# Patient Record
Sex: FEMALE | Race: BLACK OR AFRICAN AMERICAN | NOT HISPANIC OR LATINO | Employment: OTHER | URBAN - METROPOLITAN AREA
[De-identification: names, ages, dates, MRNs, and addresses within clinical notes are randomized per-mention and may not be internally consistent; named-entity substitution may affect disease eponyms.]

---

## 2018-04-26 ENCOUNTER — OFFICE VISIT (OUTPATIENT)
Dept: FAMILY MEDICINE | Facility: CLINIC | Age: 63
End: 2018-04-26
Payer: COMMERCIAL

## 2018-04-26 VITALS
WEIGHT: 191.38 LBS | SYSTOLIC BLOOD PRESSURE: 124 MMHG | OXYGEN SATURATION: 98 % | BODY MASS INDEX: 31.89 KG/M2 | TEMPERATURE: 98 F | HEIGHT: 65 IN | HEART RATE: 91 BPM | RESPIRATION RATE: 16 BRPM | DIASTOLIC BLOOD PRESSURE: 84 MMHG

## 2018-04-26 DIAGNOSIS — S80.211A ABRASION OF RIGHT KNEE, INITIAL ENCOUNTER: ICD-10-CM

## 2018-04-26 DIAGNOSIS — J00 ACUTE NASOPHARYNGITIS (COMMON COLD): Primary | ICD-10-CM

## 2018-04-26 PROCEDURE — 99204 OFFICE O/P NEW MOD 45 MIN: CPT | Mod: S$GLB,,, | Performed by: INTERNAL MEDICINE

## 2018-04-26 PROCEDURE — 99999 PR PBB SHADOW E&M-NEW PATIENT-LVL III: CPT | Mod: PBBFAC,,, | Performed by: INTERNAL MEDICINE

## 2018-04-26 RX ORDER — PROMETHAZINE HYDROCHLORIDE AND DEXTROMETHORPHAN HYDROBROMIDE 6.25; 15 MG/5ML; MG/5ML
5 SYRUP ORAL EVERY 12 HOURS PRN
Qty: 180 ML | Refills: 0 | Status: SHIPPED | OUTPATIENT
Start: 2018-04-26 | End: 2018-05-06

## 2018-04-26 RX ORDER — LEVOTHYROXINE SODIUM 25 UG/1
TABLET ORAL
Refills: 1 | COMMUNITY
Start: 2018-02-19

## 2018-04-26 NOTE — PROGRESS NOTES
SUBJECTIVE     Chief Complaint   Patient presents with    Cough       HPI  Rosana Alonso is a 62 y.o. female with multiple medical diagnoses as listed in the medical history and problem list that presents for evaluation of cough x 2 days. Pt reports a productive cough, hoarseness, and sore throat, and R ear pain. Denies any runny nose, SOB, chest tightness, fever, chills, or night sweats. Pt has been taking OTC cough meds and Nyquil with some improvement of symptoms. +sick contacts(grandson with URI). Denies any recent travel.     PAST MEDICAL HISTORY:  Past Medical History:   Diagnosis Date    Hypothyroid        PAST SURGICAL HISTORY:  Past Surgical History:   Procedure Laterality Date     SECTION      ,        SOCIAL HISTORY:  Social History     Social History    Marital status: Single     Spouse name: N/A    Number of children: N/A    Years of education: N/A     Occupational History    Not on file.     Social History Main Topics    Smoking status: Never Smoker    Smokeless tobacco: Never Used    Alcohol use No    Drug use: No    Sexual activity: Not on file     Other Topics Concern    Not on file     Social History Narrative    No narrative on file       FAMILY HISTORY:  Family History   Problem Relation Age of Onset    Diabetes type II Mother     Cardiomyopathy Mother     Hypertension Mother     Sudden death Father      Dad killed in MVA       ALLERGIES AND MEDICATIONS: updated and reviewed.  Review of patient's allergies indicates:  No Known Allergies  Current Outpatient Prescriptions   Medication Sig Dispense Refill    levothyroxine (SYNTHROID) 25 MCG tablet TK 1 T PO QD UTD  1    promethazine-dextromethorphan (PROMETHAZINE-DM) 6.25-15 mg/5 mL Syrp Take 5 mLs by mouth every 12 (twelve) hours as needed. 180 mL 0     No current facility-administered medications for this visit.        ROS  Review of Systems   Constitutional: Negative for chills and fever.   HENT:  "Positive for congestion and sore throat. Negative for hearing loss.    Eyes: Negative for visual disturbance.   Respiratory: Positive for cough. Negative for shortness of breath.    Cardiovascular: Negative for chest pain, palpitations and leg swelling.   Gastrointestinal: Negative for abdominal pain, constipation, diarrhea, nausea and vomiting.   Genitourinary: Negative for dysuria, frequency and urgency.   Musculoskeletal: Negative for arthralgias, joint swelling and myalgias.   Skin: Positive for wound (R knee abrasion). Negative for rash.   Neurological: Negative for headaches.   Psychiatric/Behavioral: Negative for agitation and confusion. The patient is not nervous/anxious.          OBJECTIVE     Physical Exam  Vitals:    04/26/18 1603   BP: 124/84   Pulse: 91   Resp: 16   Temp: 98.3 °F (36.8 °C)    Body mass index is 31.84 kg/m².  Weight: 86.8 kg (191 lb 5.8 oz)   Height: 5' 5" (165.1 cm)     Physical Exam   Constitutional: She is oriented to person, place, and time. She appears well-developed and well-nourished. No distress.   HENT:   Head: Normocephalic and atraumatic.   Right Ear: Hearing, tympanic membrane and external ear normal.   Left Ear: Hearing, tympanic membrane and external ear normal.   Nose: Nose normal. No rhinorrhea. Right sinus exhibits no maxillary sinus tenderness and no frontal sinus tenderness. Left sinus exhibits no maxillary sinus tenderness and no frontal sinus tenderness.   Mouth/Throat: No uvula swelling. Posterior oropharyngeal erythema present. No posterior oropharyngeal edema.   Eyes: Conjunctivae and EOM are normal. Right eye exhibits no discharge. Left eye exhibits no discharge. No scleral icterus.   Neck: Normal range of motion. Neck supple. No JVD present. No tracheal deviation present.   Cardiovascular: Normal rate, regular rhythm and intact distal pulses.  Exam reveals no gallop and no friction rub.    No murmur heard.  Pulmonary/Chest: Effort normal and breath sounds " normal. No respiratory distress. She has no wheezes.   No dullness to percussion, normal fremitus, no egophany   Abdominal: Soft. Bowel sounds are normal. She exhibits no distension and no mass. There is no tenderness. There is no rebound and no guarding.   Musculoskeletal: Normal range of motion. She exhibits no edema, tenderness or deformity.   Neurological: She is alert and oriented to person, place, and time. She exhibits normal muscle tone. Coordination normal.   Skin: Skin is warm and dry. No rash noted. No erythema.   Healing R lateral knee abrasion developing eschar; no surrounding erythema or drainage appreciated   Psychiatric: She has a normal mood and affect. Her behavior is normal. Judgment and thought content normal.         Health Maintenance    Patient has no pending health maintenance at this time           ASSESSMENT     62 y.o. female with     1. Acute nasopharyngitis (common cold)    2. Abrasion of right knee, initial encounter        PLAN:     1. Acute nasopharyngitis (common cold)  - Continue symptomatic treatment with rest, increase fluid intake, tylenol or ibuprofen PRN fever(temp >/= 100.4) or body aches. Okay to take OTC antihistamines, i.e. Bendaryl, Claritin, Allegra, etc. as needed.  - Okay to gargle with warm, salt water or use throat lozenges as needed  - promethazine-dextromethorphan (PROMETHAZINE-DM) 6.25-15 mg/5 mL Syrp; Take 5 mLs by mouth every 12 (twelve) hours as needed.  Dispense: 180 mL; Refill: 0    2. Abrasion of right knee, initial encounter  - Pt advised to cleanse wound, pat dry, and apply triple Abx ointment or Neosporin followed by a clean, dry dressing        RTC in 1-2 weeks as needed for any acute worsening of current condition or failure to improve     Merle Chacon MD  04/26/2018 4:18 PM        No Follow-up on file.

## 2018-04-27 DIAGNOSIS — Z12.39 BREAST CANCER SCREENING: ICD-10-CM

## 2018-08-20 DIAGNOSIS — Z12.11 COLON CANCER SCREENING: ICD-10-CM

## 2018-10-18 ENCOUNTER — OFFICE VISIT (OUTPATIENT)
Dept: FAMILY MEDICINE | Facility: CLINIC | Age: 63
End: 2018-10-18
Payer: COMMERCIAL

## 2018-10-18 VITALS
HEART RATE: 82 BPM | TEMPERATURE: 98 F | SYSTOLIC BLOOD PRESSURE: 128 MMHG | DIASTOLIC BLOOD PRESSURE: 84 MMHG | HEIGHT: 65 IN | WEIGHT: 190.69 LBS | OXYGEN SATURATION: 97 % | BODY MASS INDEX: 31.77 KG/M2

## 2018-10-18 DIAGNOSIS — M65.331 TRIGGER MIDDLE FINGER OF RIGHT HAND: Primary | ICD-10-CM

## 2018-10-18 DIAGNOSIS — Z00.00 HEALTH CARE MAINTENANCE: ICD-10-CM

## 2018-10-18 DIAGNOSIS — Z23 NEEDS FLU SHOT: ICD-10-CM

## 2018-10-18 DIAGNOSIS — M79.645 PAIN OF LEFT MIDDLE FINGER: ICD-10-CM

## 2018-10-18 DIAGNOSIS — Z13.220 LIPID SCREENING: ICD-10-CM

## 2018-10-18 PROCEDURE — 90471 IMMUNIZATION ADMIN: CPT | Mod: S$GLB,,, | Performed by: INTERNAL MEDICINE

## 2018-10-18 PROCEDURE — 99999 PR PBB SHADOW E&M-EST. PATIENT-LVL III: CPT | Mod: PBBFAC,,, | Performed by: INTERNAL MEDICINE

## 2018-10-18 PROCEDURE — 90686 IIV4 VACC NO PRSV 0.5 ML IM: CPT | Mod: S$GLB,,, | Performed by: INTERNAL MEDICINE

## 2018-10-18 PROCEDURE — 99213 OFFICE O/P EST LOW 20 MIN: CPT | Mod: 25,S$GLB,, | Performed by: INTERNAL MEDICINE

## 2018-10-18 PROCEDURE — 3008F BODY MASS INDEX DOCD: CPT | Mod: CPTII,S$GLB,, | Performed by: INTERNAL MEDICINE

## 2018-10-18 NOTE — PROGRESS NOTES
SUBJECTIVE     Chief Complaint   Patient presents with    Bilateral Hand Pain/ Fingers       HPI  Rosana Alonso is a 63 y.o. female with multiple medical diagnoses as listed in the medical history and problem list that presents for evaluation of B/L 3rd digit pain x 6 months. Pt reports she developed R 3rd digit trigger finger and 3 months ago developed L hand 3rd digit pain at the MCP joint. She awakens with morning stiffness to the those fingers only and edematous. She recently started Glucosamine since August, but has not taken it consistently. The R hand has improved, but the L 3rd digit remains painful. Denies any preceding trauma, falls, or heavy lifting. Pt does some 5 lb dumb bell lifting daily.    PAST MEDICAL HISTORY:  Past Medical History:   Diagnosis Date    Hypothyroid        PAST SURGICAL HISTORY:  Past Surgical History:   Procedure Laterality Date     SECTION      1991       SOCIAL HISTORY:  Social History     Socioeconomic History    Marital status:      Spouse name: Not on file    Number of children: Not on file    Years of education: Not on file    Highest education level: Not on file   Social Needs    Financial resource strain: Not on file    Food insecurity - worry: Not on file    Food insecurity - inability: Not on file    Transportation needs - medical: Not on file    Transportation needs - non-medical: Not on file   Occupational History    Not on file   Tobacco Use    Smoking status: Never Smoker    Smokeless tobacco: Never Used   Substance and Sexual Activity    Alcohol use: No    Drug use: No    Sexual activity: Not on file   Other Topics Concern    Not on file   Social History Narrative    Not on file       FAMILY HISTORY:  Family History   Problem Relation Age of Onset    Diabetes type II Mother     Cardiomyopathy Mother     Hypertension Mother     Sudden death Father         Dad killed in MVA       ALLERGIES AND MEDICATIONS: updated  "and reviewed.  Review of patient's allergies indicates:  No Known Allergies  Current Outpatient Medications   Medication Sig Dispense Refill    GLUCOSAMINE HCL ORAL Take by mouth.      levothyroxine (SYNTHROID) 25 MCG tablet TK 1 T PO QD UTD  1     No current facility-administered medications for this visit.        ROS  Review of Systems   Constitutional: Negative for chills and fever.   HENT: Negative for hearing loss and sore throat.    Eyes: Negative for visual disturbance.   Respiratory: Negative for cough and shortness of breath.    Cardiovascular: Negative for chest pain, palpitations and leg swelling.   Gastrointestinal: Negative for abdominal pain, constipation, diarrhea, nausea and vomiting.   Genitourinary: Negative for dysuria, frequency and urgency.   Musculoskeletal: Positive for arthralgias (B/L 3rd digit pain/swelling). Negative for joint swelling and myalgias.   Skin: Negative for rash and wound.   Neurological: Negative for headaches.   Psychiatric/Behavioral: Negative for agitation and confusion. The patient is not nervous/anxious.          OBJECTIVE     Physical Exam  Vitals:    10/18/18 1429   BP: 128/84   Pulse: 82   Temp: 98.4 °F (36.9 °C)    Body mass index is 31.73 kg/m².  Weight: 86.5 kg (190 lb 11.2 oz)   Height: 5' 5" (165.1 cm)     Physical Exam   Constitutional: She is oriented to person, place, and time. She appears well-developed and well-nourished. No distress.   HENT:   Head: Normocephalic and atraumatic.   Right Ear: External ear normal.   Left Ear: External ear normal.   Nose: Nose normal.   Mouth/Throat: Oropharynx is clear and moist.   Eyes: Conjunctivae and EOM are normal. Right eye exhibits no discharge. Left eye exhibits no discharge. No scleral icterus.   Neck: Normal range of motion. Neck supple. No JVD present. No tracheal deviation present.   Pulmonary/Chest: Effort normal. No respiratory distress.   Musculoskeletal: Normal range of motion. She exhibits edema (L 3rd " digit) and tenderness (R 3rd digit MCP TTP). She exhibits no deformity.   Neurological: She is alert and oriented to person, place, and time. She exhibits normal muscle tone. Coordination normal.   Skin: Skin is warm and dry. No rash noted. No erythema.   Psychiatric: She has a normal mood and affect. Her behavior is normal. Judgment and thought content normal.         Health Maintenance       Date Due Completion Date    Hepatitis C Screening 1955 ---    Lipid Panel 1955 ---    TETANUS VACCINE 07/23/1973 ---    Pap Smear with HPV Cotest 07/23/1976 ---    Mammogram 07/23/1995 ---    Colonoscopy 07/23/2005 ---    Zoster Vaccine 07/23/2015 ---    Influenza Vaccine 08/01/2018 ---            ASSESSMENT     63 y.o. female with     1. Trigger middle finger of right hand    2. Pain of left middle finger    3. Lipid screening    4. Health care maintenance    5. Needs flu shot        PLAN:     1. Trigger middle finger of right hand  - Pt advised on rest, ice therapy, and prn NSAIDs  - Okay to continue OTC glucosamine and tumeric as pt has failed previous steroid injection into joint  - Rheumatoid factor; Future  - Cyclic citrul peptide antibody, IgG; Future  - Sedimentation rate; Future  - C-reactive protein; Future    2. Pain of left middle finger  - F/U on labs to r/o RA given AM stiffness and pain  - Rest, ice, and NSAIDs as above  - Rheumatoid factor; Future  - Cyclic citrul peptide antibody, IgG; Future  - Sedimentation rate; Future  - C-reactive protein; Future    3. Lipid screening  - Lipid panel; Future    4. Health care maintenance  - CBC auto differential; Future  - Comprehensive metabolic panel; Future  - Hemoglobin A1c; Future  - TSH; Future    5. Needs flu shot  - Influenza - Quadrivalent (3 years & older) (PF)          RTC in 2 weeks for repeat assessment of current treatment plan       Merle Chacon MD  10/18/2018 2:57 PM        No Follow-up on file.

## 2018-10-18 NOTE — PATIENT INSTRUCTIONS
What is Trigger Finger?  Trigger finger is an inflammation of tissue inside your finger or thumb. It is also called tenosynovitis (ten-oh-sin-oh-VY-tis). Tendons (cordlike fibers that attach muscle to bone and allow you to bend the joints) become swollen. So does the synovium (a slick membrane that allows the tendons to move easily). This makes it difficult to straighten the finger or thumb.    Causes  Repeated use of a tool with strong gripping, such as a drill or wrench, can irritate and inflame the tendons and the synovium. It is also more common in certain medical conditions such as rheumatoid arthritis, gout, and diabetes. But often the cause of trigger finger is unknown.  Inside your finger  Tendons connect muscles in your forearm to the bones in your fingers. The tendons in each finger are surrounded by a protective tendon sheath. This sheath is lined with synovium, which produces a fluid that allows the tendons to slide easily when you bend and straighten the finger. If a tendon is irritated, it becomes inflamed.  When a tendon is inflamed  When a tendon is inflamed, it causes the lining of the tendon sheath to swell and thicken. Or the tendon itself may thicken. Then the sheath pinches the tendon, and the tendon can no longer slide easily inside the sheath. When you straighten your finger, the tendon sticks or locks as it tries to squeeze back through the sheath.    Symptoms  The first sign of trigger finger may be pain where the finger or thumb joins the palm. You may also notice some swelling. As the tendon becomes more inflamed, the finger may start to catch when you try to straighten it. When the locked tendon releases, the finger jumps, as if you were releasing the trigger of a gun. This further irritates the tendon, and may set up a cycle of catching and swelling.   Date Last Reviewed: 9/28/2015  © 8574-3398 [employer]. [address] [place] [address], [place] [address]. All rights reserved.  This information is not intended as a substitute for professional medical care. Always follow your healthcare professional's instructions.        Treating Trigger Finger     The tendon sheath is opened to release the tendon. Once the tendon can move freely again, the finger can bend and straighten more normally.     Trigger finger occurs when the tissue inside your finger or thumb becomes inflamed. Mild cases can be treated without surgery. If the problem is severe, surgery may be needed. Your doctor will discuss your options with you.  Nonsurgical treatment  For mild symptoms, your doctor may have you rest the finger or thumb. You may also be told to take anti-inflammatory medicines. These include ibuprofen or aspirin. You may be given an injection of medicine in the base of the finger or thumb. This typically is a steroid, such as cortisone.  Surgery  If nonsurgical treatments dont ease your symptoms, surgery may be recommended. A tendon is a cordlike fiber that attaches muscle to bone and allows joints to bend. The tendon is surrounded by a protective cover called a sheath. During surgery, the sheath in your finger or thumb is opened to enlarge the space and release the swollen tendon. This allows the finger or thumb to bend and straighten normally. Surgery takes about 20 minutes. It can often be done using a local anesthetic. You may be able to go home the same day. Your hand will be wrapped in a soft bandage. You may need to wear a plaster splint for a short time to keep the finger or thumb still as it heals. The stitches will be removed in about 2 weeks. Your doctor can discuss the risks and benefits of surgery with you.  Date Last Reviewed: 9/21/2015 © 2000-2017 [employer], "[employer]". [address] [place] [address]. All rights reserved. This information is not intended as a substitute for professional medical care. Always follow your healthcare professional's instructions.

## 2018-10-31 ENCOUNTER — LAB VISIT (OUTPATIENT)
Dept: LAB | Facility: HOSPITAL | Age: 63
End: 2018-10-31
Attending: INTERNAL MEDICINE
Payer: COMMERCIAL

## 2018-10-31 DIAGNOSIS — M79.645 PAIN OF LEFT MIDDLE FINGER: ICD-10-CM

## 2018-10-31 DIAGNOSIS — Z00.00 HEALTH CARE MAINTENANCE: ICD-10-CM

## 2018-10-31 DIAGNOSIS — M65.331 TRIGGER MIDDLE FINGER OF RIGHT HAND: ICD-10-CM

## 2018-10-31 DIAGNOSIS — Z13.220 LIPID SCREENING: ICD-10-CM

## 2018-10-31 LAB
ALBUMIN SERPL BCP-MCNC: 4.1 G/DL
ALP SERPL-CCNC: 73 U/L
ALT SERPL W/O P-5'-P-CCNC: 17 U/L
ANION GAP SERPL CALC-SCNC: 7 MMOL/L
AST SERPL-CCNC: 29 U/L
BASOPHILS # BLD AUTO: 0.01 K/UL
BASOPHILS NFR BLD: 0.2 %
BILIRUB SERPL-MCNC: 0.6 MG/DL
BUN SERPL-MCNC: 12 MG/DL
CALCIUM SERPL-MCNC: 10 MG/DL
CHLORIDE SERPL-SCNC: 104 MMOL/L
CHOLEST SERPL-MCNC: 262 MG/DL
CHOLEST/HDLC SERPL: 4.8 {RATIO}
CO2 SERPL-SCNC: 28 MMOL/L
CREAT SERPL-MCNC: 0.9 MG/DL
CRP SERPL-MCNC: 1.7 MG/L
DIFFERENTIAL METHOD: NORMAL
EOSINOPHIL # BLD AUTO: 0.1 K/UL
EOSINOPHIL NFR BLD: 1.1 %
ERYTHROCYTE [DISTWIDTH] IN BLOOD BY AUTOMATED COUNT: 12.1 %
ERYTHROCYTE [SEDIMENTATION RATE] IN BLOOD BY WESTERGREN METHOD: 27 MM/HR
EST. GFR  (AFRICAN AMERICAN): >60 ML/MIN/1.73 M^2
EST. GFR  (NON AFRICAN AMERICAN): >60 ML/MIN/1.73 M^2
GLUCOSE SERPL-MCNC: 89 MG/DL
HCT VFR BLD AUTO: 41.4 %
HDLC SERPL-MCNC: 55 MG/DL
HDLC SERPL: 21 %
HGB BLD-MCNC: 14.2 G/DL
LDLC SERPL CALC-MCNC: 182.2 MG/DL
LYMPHOCYTES # BLD AUTO: 2.1 K/UL
LYMPHOCYTES NFR BLD: 36.1 %
MCH RBC QN AUTO: 31 PG
MCHC RBC AUTO-ENTMCNC: 34.3 G/DL
MCV RBC AUTO: 90 FL
MONOCYTES # BLD AUTO: 0.4 K/UL
MONOCYTES NFR BLD: 7.5 %
NEUTROPHILS # BLD AUTO: 3.1 K/UL
NEUTROPHILS NFR BLD: 55.1 %
NONHDLC SERPL-MCNC: 207 MG/DL
PLATELET # BLD AUTO: 270 K/UL
PMV BLD AUTO: 10.9 FL
POTASSIUM SERPL-SCNC: 5 MMOL/L
PROT SERPL-MCNC: 8.3 G/DL
RBC # BLD AUTO: 4.58 M/UL
SODIUM SERPL-SCNC: 139 MMOL/L
TRIGL SERPL-MCNC: 124 MG/DL
TSH SERPL DL<=0.005 MIU/L-ACNC: 3.05 UIU/ML
WBC # BLD AUTO: 5.7 K/UL

## 2018-10-31 PROCEDURE — 86140 C-REACTIVE PROTEIN: CPT

## 2018-10-31 PROCEDURE — 85652 RBC SED RATE AUTOMATED: CPT

## 2018-10-31 PROCEDURE — 80061 LIPID PANEL: CPT

## 2018-10-31 PROCEDURE — 86200 CCP ANTIBODY: CPT

## 2018-10-31 PROCEDURE — 80053 COMPREHEN METABOLIC PANEL: CPT

## 2018-10-31 PROCEDURE — 83036 HEMOGLOBIN GLYCOSYLATED A1C: CPT

## 2018-10-31 PROCEDURE — 85025 COMPLETE CBC W/AUTO DIFF WBC: CPT

## 2018-10-31 PROCEDURE — 84443 ASSAY THYROID STIM HORMONE: CPT

## 2018-10-31 PROCEDURE — 86431 RHEUMATOID FACTOR QUANT: CPT

## 2018-10-31 PROCEDURE — 36415 COLL VENOUS BLD VENIPUNCTURE: CPT | Mod: PO

## 2018-11-01 DIAGNOSIS — E78.5 HYPERLIPIDEMIA, UNSPECIFIED HYPERLIPIDEMIA TYPE: Primary | ICD-10-CM

## 2018-11-01 LAB
CCP AB SER IA-ACNC: <0.5 U/ML
ESTIMATED AVG GLUCOSE: 103 MG/DL
HBA1C MFR BLD HPLC: 5.2 %
RHEUMATOID FACT SERPL-ACNC: <10 IU/ML

## 2018-11-01 RX ORDER — ATORVASTATIN CALCIUM 40 MG/1
40 TABLET, FILM COATED ORAL DAILY
Qty: 90 TABLET | Refills: 3 | Status: SHIPPED | OUTPATIENT
Start: 2018-11-01 | End: 2018-11-14

## 2018-11-08 ENCOUNTER — TELEPHONE (OUTPATIENT)
Dept: FAMILY MEDICINE | Facility: CLINIC | Age: 63
End: 2018-11-08

## 2018-11-08 NOTE — TELEPHONE ENCOUNTER
Called pt again, but no answer. Left message instructing her to call back at her earliest convenience.

## 2018-11-08 NOTE — TELEPHONE ENCOUNTER
----- Message from Beena Yan sent at 11/8/2018  2:58 PM CST -----  Contact: self  Pt returned call regarding previous message. Please contact pt at [phone].

## 2018-11-08 NOTE — TELEPHONE ENCOUNTER
Called patient. Informed that she has additional questions regarding the results. Wants to talk about alternatives before taking medications for cholesterol and has no idea what a Sed Rate is and what is inflamed. Patient is requesting a callback from provider with concerns.  Please Advise

## 2018-11-08 NOTE — TELEPHONE ENCOUNTER
----- Message from Jennifer Velazquez sent at 11/8/2018  9:11 AM CST -----  Contact: self [phone]  Pt received a letter with letter lab result & would like to get a call back from Dr. Chacon

## 2018-11-09 ENCOUNTER — TELEPHONE (OUTPATIENT)
Dept: FAMILY MEDICINE | Facility: CLINIC | Age: 63
End: 2018-11-09

## 2018-11-09 DIAGNOSIS — E78.5 HYPERLIPIDEMIA, UNSPECIFIED HYPERLIPIDEMIA TYPE: Primary | ICD-10-CM

## 2018-11-09 NOTE — TELEPHONE ENCOUNTER
----- Message from Raudel Saravia sent at 11/9/2018  9:08 AM CST -----  Contact: Self/[phone]  The patient returned the staff call regarding her test results.        Thank you

## 2018-11-14 NOTE — TELEPHONE ENCOUNTER
Called pt and she has decided against taking cholesterol meds. She already exercises, so will now start a low cholesterol diet. Pt advised to RTC in 6 months for repeat lab draw and will then determine if medication is warranted. All questions/concerns addressed and she voiced understanding. Labs ordered.

## 2019-05-21 ENCOUNTER — TELEPHONE (OUTPATIENT)
Dept: ADMINISTRATIVE | Facility: HOSPITAL | Age: 64
End: 2019-05-21

## 2019-07-02 ENCOUNTER — PATIENT OUTREACH (OUTPATIENT)
Dept: ADMINISTRATIVE | Facility: HOSPITAL | Age: 64
End: 2019-07-02

## 2019-08-23 DIAGNOSIS — Z12.11 COLON CANCER SCREENING: ICD-10-CM

## 2019-09-26 ENCOUNTER — HOSPITAL ENCOUNTER (EMERGENCY)
Facility: HOSPITAL | Age: 64
Discharge: HOME | End: 2019-09-26
Attending: EMERGENCY MEDICINE
Payer: COMMERCIAL

## 2019-09-26 VITALS
DIASTOLIC BLOOD PRESSURE: 77 MMHG | RESPIRATION RATE: 20 BRPM | HEIGHT: 65 IN | BODY MASS INDEX: 30.49 KG/M2 | TEMPERATURE: 98 F | SYSTOLIC BLOOD PRESSURE: 141 MMHG | HEART RATE: 70 BPM | WEIGHT: 183 LBS | OXYGEN SATURATION: 99 %

## 2019-09-26 DIAGNOSIS — J30.89 NON-SEASONAL ALLERGIC RHINITIS, UNSPECIFIED TRIGGER: Primary | ICD-10-CM

## 2019-09-26 PROCEDURE — 99282 EMERGENCY DEPT VISIT SF MDM: CPT | Mod: ER

## 2019-09-26 RX ORDER — LORATADINE 10 MG/1
10 TABLET ORAL DAILY
Qty: 30 TABLET | Refills: 0 | Status: SHIPPED | OUTPATIENT
Start: 2019-09-26 | End: 2019-10-26

## 2019-09-26 RX ORDER — FLUTICASONE PROPIONATE 50 MCG
1 SPRAY, SUSPENSION (ML) NASAL 2 TIMES DAILY PRN
Qty: 15 G | Refills: 0 | Status: SHIPPED | OUTPATIENT
Start: 2019-09-26

## 2019-09-26 NOTE — DISCHARGE INSTRUCTIONS
We have prescribed you Claritin and Flonase 4 year symptoms. Please increased fluids and rest for the next 24-48 hours.  Warm fluids and steam may help thin secretions.  Please follow up with her PCP if symptoms not improving in the next 7-10 days.    Our goal in the emergency department is to always give you outstanding care and exceptional service. You may receive a survey by mail or e-mail in the next week regarding your experience in our ED. We would greatly appreciate your completing and returning the survey. Your feedback provides us with a way to recognize our staff who give very good care and it helps us learn how to improve when your experience was below our aspiration of excellence.

## 2019-09-26 NOTE — ED PROVIDER NOTES
Encounter Date: 2019       History     Chief Complaint   Patient presents with    URI     Sinus congestion and mild sore throat since   Patient has been taking sinus max mucinex     Patient is a 64-year-old female with medical history of hypothyroidism presenting to the ED for postnasal drip, scratchy throat congestion, sneezing for the past 4 days.  Patient states she has attempted Mucinex over-the-counter with no relief of symptoms.  Patient denies any cough, fever, chills, chest pain or abdominal pain.    The history is provided by the patient.     Review of patient's allergies indicates:  No Known Allergies  Past Medical History:   Diagnosis Date    Hypothyroid      Past Surgical History:   Procedure Laterality Date     SECTION      ,      Family History   Problem Relation Age of Onset    Diabetes type II Mother     Cardiomyopathy Mother     Hypertension Mother     Sudden death Father         Dad killed in MVA     Social History     Tobacco Use    Smoking status: Never Smoker    Smokeless tobacco: Never Used   Substance Use Topics    Alcohol use: No    Drug use: No     Review of Systems   Constitutional: Negative for activity change, appetite change, chills, fatigue and fever.   HENT: Positive for congestion, postnasal drip, rhinorrhea, sneezing and sore throat. Negative for sinus pressure, sinus pain and trouble swallowing.    Eyes: Negative for photophobia and visual disturbance.   Respiratory: Negative for cough, chest tightness, shortness of breath and wheezing.    Cardiovascular: Negative for chest pain and palpitations.   Gastrointestinal: Negative for abdominal pain, constipation, diarrhea, nausea and vomiting.   Genitourinary: Negative for decreased urine volume, dysuria, frequency and urgency.   Musculoskeletal: Negative for arthralgias, back pain and joint swelling.   Skin: Negative for color change, pallor and wound.   Allergic/Immunologic: Negative for  immunocompromised state.   Neurological: Negative for dizziness, syncope, weakness, numbness and headaches.   Hematological: Does not bruise/bleed easily.   All other systems reviewed and are negative.      Physical Exam     Initial Vitals [09/26/19 1353]   BP Pulse Resp Temp SpO2   (!) 141/77 70 20 98.3 °F (36.8 °C) 99 %      MAP       --         Physical Exam    Nursing note and vitals reviewed.  Constitutional: Vital signs are normal. She appears well-developed and well-nourished. She is cooperative. She does not have a sickly appearance. She does not appear ill. No distress.   HENT:   Head: Normocephalic and atraumatic.   Nose: Mucosal edema and rhinorrhea present. Right sinus exhibits no maxillary sinus tenderness and no frontal sinus tenderness. Left sinus exhibits no maxillary sinus tenderness and no frontal sinus tenderness.   Mouth/Throat: Uvula is midline and mucous membranes are normal. Posterior oropharyngeal erythema present.   Eyes: EOM and lids are normal. Pupils are equal, round, and reactive to light.   Neck: Trachea normal, normal range of motion, full passive range of motion without pain and phonation normal. Neck supple. No spinous process tenderness and no muscular tenderness present. No JVD present.   Cardiovascular: Normal rate, regular rhythm and intact distal pulses.   Pulses:       Radial pulses are 2+ on the right side, and 2+ on the left side.   Pulmonary/Chest: Effort normal and breath sounds normal. She has no wheezes. She has no rhonchi.   Abdominal: Soft. Normal appearance and bowel sounds are normal. She exhibits no distension. There is no tenderness. There is no rigidity, no rebound and no guarding.   Neurological: She is alert and oriented to person, place, and time. She has normal strength. No sensory deficit. GCS eye subscore is 4. GCS verbal subscore is 5. GCS motor subscore is 6.   Skin: Skin is warm, dry and intact. Capillary refill takes 2 to 3 seconds. No abrasion, no  laceration and no rash noted. No pallor.   Psychiatric: She has a normal mood and affect.         ED Course   Procedures  Labs Reviewed - No data to display       Imaging Results    None          Medical Decision Making:   Initial Assessment:   Emergent evaluation of a 63 yo female patient presenting to the ER with chief complaint of scratchy throat, sneezing and congestion for the past 4 days.  Patient states she has tried over-the-counter medication with no relief of symptoms. On exam patient is A&O x3.  No sinus tenderness to palpation.  Breath sounds clear bilaterally.  Mucous membranes pink and moist.  Oropharynx with erythema but no edema or exudates noted. Mucosal edema noted with some mild rhinorrhea.  Abdomen soft and nontender.  Cap refill less than 3 sec.  No clinical signs of infection noted.  Differential Diagnosis:   Differential diagnoses include but are not limited to viral URI, sinusitis, allergic rhinitis, seasonal allergies.      ED Management:  I do not feel labs or imaging are pertinent for the care of this patient.  Patient advised to start taking Claritin and Flonase.  Patient advised steam may help thin secretions.  Warm drinks and teas may help for comfort.   Follow up with PCP if symptoms not resolving in the next 7-10 days.    Patient is hemodynamically stable, vital signs are normal. Discharge instructions given. Return to ED precautions discussed. Follow up as directed. Pt verbalized understanding of this plan.  Pt is stable for discharge.                       Clinical Impression:       ICD-10-CM ICD-9-CM   1. Non-seasonal allergic rhinitis, unspecified trigger J30.89 477.8         Disposition:   Disposition: Discharged  Condition: Stable                        Talia Hayward NP  09/26/19 [id number]

## 2019-10-29 ENCOUNTER — CLINICAL SUPPORT (OUTPATIENT)
Dept: FAMILY MEDICINE | Facility: CLINIC | Age: 64
End: 2019-10-29
Payer: COMMERCIAL

## 2019-10-29 DIAGNOSIS — Z23 NEEDS FLU SHOT: Primary | ICD-10-CM

## 2019-10-29 PROCEDURE — 90471 FLU VACCINE (QUAD) GREATER THAN OR EQUAL TO 3YO PRESERVATIVE FREE IM: ICD-10-PCS | Mod: S$GLB,,, | Performed by: INTERNAL MEDICINE

## 2019-10-29 PROCEDURE — 90471 IMMUNIZATION ADMIN: CPT | Mod: S$GLB,,, | Performed by: INTERNAL MEDICINE

## 2019-10-29 PROCEDURE — 90686 FLU VACCINE (QUAD) GREATER THAN OR EQUAL TO 3YO PRESERVATIVE FREE IM: ICD-10-PCS | Mod: S$GLB,,, | Performed by: INTERNAL MEDICINE

## 2019-10-29 PROCEDURE — 90686 IIV4 VACC NO PRSV 0.5 ML IM: CPT | Mod: S$GLB,,, | Performed by: INTERNAL MEDICINE

## 2019-10-29 NOTE — PROGRESS NOTES
After obtaining consent, and per orders of Dr. Chacon, injection of flu shot given into the left deltoid by Kita Estrada. Patient instructed to remain in clinic for 20 minutes afterwards, and to report any adverse reaction to me immediately.

## 2020-07-20 ENCOUNTER — PATIENT OUTREACH (OUTPATIENT)
Dept: ADMINISTRATIVE | Facility: HOSPITAL | Age: 65
End: 2020-07-20

## 2020-07-31 DIAGNOSIS — Z11.59 NEED FOR HEPATITIS C SCREENING TEST: ICD-10-CM

## 2020-09-04 DIAGNOSIS — Z12.11 COLON CANCER SCREENING: ICD-10-CM

## 2020-10-03 ENCOUNTER — IMMUNIZATION (OUTPATIENT)
Dept: OBSTETRICS AND GYNECOLOGY | Facility: CLINIC | Age: 65
End: 2020-10-03
Payer: COMMERCIAL

## 2020-10-03 PROCEDURE — 90471 IMMUNIZATION ADMIN: CPT | Mod: S$GLB,,, | Performed by: FAMILY MEDICINE

## 2020-10-03 PROCEDURE — 90471 FLU VACCINE - QUADRIVALENT - ADJUVANTED: ICD-10-PCS | Mod: S$GLB,,, | Performed by: FAMILY MEDICINE

## 2020-10-03 PROCEDURE — 90694 VACC AIIV4 NO PRSRV 0.5ML IM: CPT | Mod: S$GLB,,, | Performed by: FAMILY MEDICINE

## 2020-10-03 PROCEDURE — 90694 FLU VACCINE - QUADRIVALENT - ADJUVANTED: ICD-10-PCS | Mod: S$GLB,,, | Performed by: FAMILY MEDICINE

## 2020-10-08 ENCOUNTER — TELEPHONE (OUTPATIENT)
Dept: FAMILY MEDICINE | Facility: CLINIC | Age: 65
End: 2020-10-08

## 2020-10-08 DIAGNOSIS — Z00.00 ANNUAL PHYSICAL EXAM: Primary | ICD-10-CM

## 2020-10-08 NOTE — TELEPHONE ENCOUNTER
Spoke to pt  She does want ot come in  She had labs with gyn so going to get copy then fax to us then we can schedule her to come

## 2020-12-16 ENCOUNTER — TELEPHONE (OUTPATIENT)
Dept: FAMILY MEDICINE | Facility: CLINIC | Age: 65
End: 2020-12-16

## 2020-12-16 NOTE — TELEPHONE ENCOUNTER
Called patient concerning [employer]. Patient stated she was interested and then I asked if we could schedule her annual visit and she informed me she got a new PCP. I informed her she will have to go through her new PCP for her colorectal screening. Patient voiced understanding and thanked me for my call.

## 2021-12-11 ENCOUNTER — IMMUNIZATION (OUTPATIENT)
Dept: PRIMARY CARE CLINIC | Facility: CLINIC | Age: 66
End: 2021-12-11

## 2021-12-11 DIAGNOSIS — Z23 NEED FOR VACCINATION: Primary | ICD-10-CM

## 2021-12-11 PROCEDURE — 0064A COVID-19, MRNA, LNP-S, PF, 100 MCG/0.25 ML DOSE VACCINE (MODERNA BOOSTER): CPT | Mod: CV19,PBBFAC | Performed by: INTERNAL MEDICINE
